# Patient Record
Sex: FEMALE
[De-identification: names, ages, dates, MRNs, and addresses within clinical notes are randomized per-mention and may not be internally consistent; named-entity substitution may affect disease eponyms.]

---

## 2018-02-28 ENCOUNTER — HOSPITAL ENCOUNTER (EMERGENCY)
Dept: HOSPITAL 14 - H.ER | Age: 33
Discharge: HOME | End: 2018-02-28
Payer: COMMERCIAL

## 2018-02-28 VITALS
SYSTOLIC BLOOD PRESSURE: 121 MMHG | OXYGEN SATURATION: 99 % | TEMPERATURE: 98.5 F | RESPIRATION RATE: 18 BRPM | HEART RATE: 108 BPM | DIASTOLIC BLOOD PRESSURE: 76 MMHG

## 2018-02-28 DIAGNOSIS — Z36.9: ICD-10-CM

## 2018-02-28 DIAGNOSIS — O20.0: Primary | ICD-10-CM

## 2018-02-28 DIAGNOSIS — Z3A.18: ICD-10-CM

## 2018-02-28 LAB
ALBUMIN SERPL-MCNC: 3.5 G/DL (ref 3.5–5)
ALBUMIN/GLOB SERPL: 1 {RATIO} (ref 1–2.1)
ALT SERPL-CCNC: 83 U/L (ref 9–52)
AST SERPL-CCNC: 71 U/L (ref 14–36)
BASOPHILS # BLD AUTO: 0 K/UL (ref 0–0.2)
BASOPHILS NFR BLD: 0.2 % (ref 0–2)
BUN SERPL-MCNC: 11 MG/DL (ref 7–17)
CALCIUM SERPL-MCNC: 9.5 MG/DL (ref 8.4–10.2)
EOSINOPHIL # BLD AUTO: 0.3 K/UL (ref 0–0.7)
EOSINOPHIL NFR BLD: 2.4 % (ref 0–4)
ERYTHROCYTE [DISTWIDTH] IN BLOOD BY AUTOMATED COUNT: 12.9 % (ref 11.5–14.5)
GFR NON-AFRICAN AMERICAN: > 60
HGB BLD-MCNC: 11.5 G/DL (ref 12–16)
LYMPHOCYTES # BLD AUTO: 2 K/UL (ref 1–4.3)
LYMPHOCYTES NFR BLD AUTO: 13.8 % (ref 20–40)
MCH RBC QN AUTO: 32.3 PG (ref 27–31)
MCHC RBC AUTO-ENTMCNC: 34.4 G/DL (ref 33–37)
MCV RBC AUTO: 93.8 FL (ref 81–99)
MONOCYTES # BLD: 1 K/UL (ref 0–0.8)
MONOCYTES NFR BLD: 6.9 % (ref 0–10)
NEUTROPHILS # BLD: 11 K/UL (ref 1.8–7)
NEUTROPHILS NFR BLD AUTO: 76.7 % (ref 50–75)
NRBC BLD AUTO-RTO: 0 % (ref 0–0)
PLATELET # BLD: 242 K/UL (ref 130–400)
PMV BLD AUTO: 8.4 FL (ref 7.2–11.7)
RBC # BLD AUTO: 3.55 MIL/UL (ref 3.8–5.2)
WBC # BLD AUTO: 14.3 K/UL (ref 4.8–10.8)

## 2018-02-28 PROCEDURE — 84702 CHORIONIC GONADOTROPIN TEST: CPT

## 2018-02-28 PROCEDURE — 99282 EMERGENCY DEPT VISIT SF MDM: CPT

## 2018-02-28 PROCEDURE — 81025 URINE PREGNANCY TEST: CPT

## 2018-02-28 PROCEDURE — 86900 BLOOD TYPING SEROLOGIC ABO: CPT

## 2018-02-28 PROCEDURE — 80053 COMPREHEN METABOLIC PANEL: CPT

## 2018-02-28 PROCEDURE — 76815 OB US LIMITED FETUS(S): CPT

## 2018-02-28 PROCEDURE — 86850 RBC ANTIBODY SCREEN: CPT

## 2018-02-28 PROCEDURE — 85025 COMPLETE CBC W/AUTO DIFF WBC: CPT

## 2018-02-28 NOTE — ED PDOC
HPI: Female  Pain


Time Seen by Provider: 18 16:37


Chief Complaint (Nursing): Abdominal Pain


Chief Complaint (Provider): Pelvic pain


History Per: Patient


History/Exam Limitations: no limitations


Onset/Duration Of Symptoms: Days (2)


Additional Complaint(s): 





Pelvic pain and cramping for 2 days.  Off and on.  No vaginal bleeding.  Is 

18wks preg.  No weakness, dysuria, abd pain, headaches.  No back pain, chest 

pain.  





Past Medical History


Reviewed: Nursing Documentation, Vital Signs


Vital Signs: 





 Last Vital Signs











Temp  98.5 F   18 15:50


 


Pulse  108 H  18 15:50


 


Resp  18   18 15:50


 


BP  121/76   18 15:50


 


Pulse Ox  99   18 15:50














- Medical History


PMH: No Chronic Diseases





- Surgical History


Surgical History: No Surg Hx





- Family History


Family History: States: Unknown Family Hx





- Immunization History


Hx Tetanus Toxoid Vaccination: No


Hx Influenza Vaccination: No


Hx Pneumococcal Vaccination: No





- Home Medications


Home Medications: 


 Ambulatory Orders











 Medication  Instructions  Recorded


 


Dextromethorphan Polistirex 30 mg PO BID #100 mer.er.12h 17





[Delsym]  


 


Oseltamivir Phosphate [Tamiflu] 75 mg PO BID #10 capsule 17


 


Naproxen [Naprosyn] 500 mg PO BID PRN #15 tablet 17


 


Naproxen [Naprosyn Tab] 1 tab PO Q8 PRN #21 tab 17














- Allergies


Allergies/Adverse Reactions: 


 Allergies











Allergy/AdvReac Type Severity Reaction Status Date / Time


 


No Known Allergies Allergy   Verified 17 14:33














Review of Systems


ROS Statement: Except As Marked, All Systems Reviewed And Found Negative


Genitourinary Female: Positive for: Pelvic Pain





Physical Exam





- Reviewed


Nursing Documentation Reviewed: Yes


Vital Signs Reviewed: Yes





- Physical Exam


Appears: Positive for: Non-toxic, No Acute Distress


Head Exam: Positive for: ATRAUMATIC, NORMAL INSPECTION, NORMOCEPHALIC


Skin: Positive for: Normal Color, Warm, DRY


Eye Exam: Positive for: EOMI, Normal appearance, PERRL


ENT: Positive for: Normal ENT Inspection


Neck: Positive for: Normal, Painless ROM


Cardiovascular/Chest: Positive for: Regular Rate, Rhythm


Respiratory: Positive for: CNT, Normal Breath Sounds


Gastrointestinal/Abdominal: Positive for: Bowel Sounds, Soft, Tenderness (

across lower pelvic)


Back: Positive for: Normal Inspection.  Negative for: L CVA Tenderness, R CVA 

Tenderness


Extremity: Positive for: Normal ROM.  Negative for: Tenderness, Pedal Edema


Neurologic/Psych: Positive for: Alert, Oriented





- Laboratory Results


Result Diagrams: 


 18 18:19





 18 18:19


Interpretation Of Abn Labs: pos rh





- ECG


O2 Sat by Pulse Oximetry: 99


Pulse Ox Interpretation: Normal





- CT Scan/US


  ** US


Other Rad Studies (CT/US): Radiology Report Reviewed


Other Rad Interpretation: iup and subchorionic bleed





- Progress


ED Course And Treament: 





1943:  Stable.  AAOx3.  Pain free.  Tolerated PO.  Fu with ob.





Disposition





- Clinical Impression


Clinical Impression: 


 Threatened 








- Patient ED Disposition


Is Patient to be Admitted: No


Counseled Patient/Family Regarding: Studies Performed, Diagnosis, Need For 

Followup





- Disposition


Referrals: 


Ollie Sweet MD [Staff Provider] - 18


Disposition: Routine/Home


Disposition Time: 19:00


Condition: STABLE


Additional Instructions: 


Return if not better in 3 days.


Instructions:  Threatened Miscarriage


Forms:  [facility] (English), [facility] ED School/Work Excuse

## 2018-03-01 NOTE — US
PROCEDURE:  Obstetrical ultrasound examination



HISTORY:

pain of pregnancy



COMPARISON:

Not available



TECHNIQUE:

Transabdominal



FINDINGS:

Ultrasound examination demonstrates a single live intrauterine 

gestation in cephalic presentation. The heart rate is 149 beats per 

minute. A posterior placenta is identified.  There is no evidence of 

placenta previa. The cervix is long and closed.  It measures 4.9 cm 

in length. A slightly irregular intramural fluid collection is seen 

near the internal os, 0.9 x 1.5 cm. Likely nabothian cyst. A grossly 

normal quantity of amniotic fluid is visualized. 



Fetal biometry yields a gestational age of 17 weeks 6 days.  The SAMANTHA 

by ultrasound is 8/2/2018. Please note that the femur length/ head 

circumference ratio is above normal range. However, the head 

circumference/abdominal circumference ratio is within the normal 

range. Nevertheless, followup ultrasound examination during 2nd 

trimester is advised. . 



Fetal anatomic evaluation is grossly limited. 



IMPRESSION:

Single live intrauterine gestation of approximately 17 weeks 6 days. 

Fetal heart rate 149 beats per minute. Posterior placenta. No previa. 

 Cervix closed. Please note that the femur length/head circumference 

ratio is above normal range.  Followup ultrasound during the 2nd 

trimester of pregnancy is advised. 



Preliminary interpretation of this examination was reported by 

[facility] at 7:52 p.m. on 2/28/2018.. There is concurrence 

of this report with the preliminary interpretation.

## 2018-08-03 ENCOUNTER — HOSPITAL ENCOUNTER (INPATIENT)
Dept: HOSPITAL 14 - H.EROB2 | Age: 33
LOS: 3 days | Discharge: HOME | DRG: 373 | End: 2018-08-06
Attending: SPECIALIST | Admitting: SPECIALIST
Payer: COMMERCIAL

## 2018-08-03 VITALS — BODY MASS INDEX: 29.6 KG/M2

## 2018-08-03 DIAGNOSIS — Z3A.40: ICD-10-CM

## 2018-08-03 DIAGNOSIS — O48.0: Primary | ICD-10-CM

## 2018-08-03 LAB
BASOPHILS # BLD AUTO: 0 K/UL (ref 0–0.2)
BASOPHILS NFR BLD: 0.2 % (ref 0–2)
EOSINOPHIL # BLD AUTO: 0.2 K/UL (ref 0–0.7)
EOSINOPHIL NFR BLD: 1.6 % (ref 0–4)
ERYTHROCYTE [DISTWIDTH] IN BLOOD BY AUTOMATED COUNT: 14.2 % (ref 11.5–14.5)
HGB BLD-MCNC: 11.9 G/DL (ref 12–16)
LYMPHOCYTES # BLD AUTO: 2 K/UL (ref 1–4.3)
LYMPHOCYTES NFR BLD AUTO: 13.9 % (ref 20–40)
MCH RBC QN AUTO: 30.7 PG (ref 27–31)
MCHC RBC AUTO-ENTMCNC: 33.6 G/DL (ref 33–37)
MCV RBC AUTO: 91.3 FL (ref 81–99)
MONOCYTES # BLD: 1 K/UL (ref 0–0.8)
MONOCYTES NFR BLD: 6.9 % (ref 0–10)
NEUTROPHILS # BLD: 11.4 K/UL (ref 1.8–7)
NEUTROPHILS NFR BLD AUTO: 77.4 % (ref 50–75)
NRBC BLD AUTO-RTO: 0 % (ref 0–0)
PLATELET # BLD: 267 K/UL (ref 130–400)
PMV BLD AUTO: 8.9 FL (ref 7.2–11.7)
RBC # BLD AUTO: 3.87 MIL/UL (ref 3.8–5.2)
WBC # BLD AUTO: 14.7 K/UL (ref 4.8–10.8)

## 2018-08-03 PROCEDURE — 4A1HXCZ MONITORING OF PRODUCTS OF CONCEPTION, CARDIAC RATE, EXTERNAL APPROACH: ICD-10-PCS | Performed by: SPECIALIST

## 2018-08-03 NOTE — OBADHP
===========================

Datetime: 2018 10:30

===========================

   

Admit Comment, IP Provider:   IUP at 40w c/o ctx last night. She was examnined in the office 
yesterday and norted to be 1cm. She had VB after exam.

      

   POBGYNH: TOP; spont Ab;  x 2

   PMH: denies

   PSH: D_C

   NKA

   PSOH: denies smoking ETOH drugs

      

   A; IUP at 40w

   postdate preg

   PLAN: discussed with PMD; admit to L_D for IOL; Cervidil ordered

   Check HBSAg 

Pelvic Type - PN:  Adequate

Extremities - PN:  Normal

Abdomen - PN:  Normal

Back - PN:  Normal

Breast - PN:  Not Done

Lungs - PN:  Normal

Heart - PN:  Normal

Thyroid - PN:  Normal

Neurologic - PN:  Normal

HEENT - PN:  Normal

General - PN:  Normal

Fetal Presentation-Admit:  Vertex

FHR - Baseline A Provider:  130

Membranes, Provider:  Intact

Pool Provider:  Negative

IP Prenatal Hx Assessment:  The Prenatal History has been Reviewed and is Current

IP Chief Complaint:  Uterine contractions

NICHD Variability Prov Fetus A:  Moderate 6-25bpm

NICHD Accel Fetus A IP Provider:  15X15

FHR Category Provider Fetus A:  Category I

NICHD Decel Fetus A IP Provider:  None

Dilatation, Provider:  0

Effacement, Provider:  0

Genitourinary Exam:  Normal

DTRs - PN:  Normal

IP Adm Impression:  Postterm, intrauterine pregnancy; No Active Labor; Intact Membranes

IP Admit Plan:  Admit to unit; Initiate labor induction protocol

## 2018-08-03 NOTE — OBHP
===========================

Datetime: 2018 10:30

===========================

   

IP Adm Impression:  Postterm, intrauterine pregnancy; No Active Labor; Intact Membranes

IP Admit Plan:  Admit to unit; Initiate labor induction protocol

Admit Comment, IP Provider:   IUP at 40w c/o ctx last night. She was examnined in the office 
yesterday and norted to be 1cm. She had VB after exam.

      

   POBGYNH: TOP; spont Ab;  x 2

   PMH: denies

   PSH: D_C

   NKA

   PSOH: denies smoking ETOH drugs

      

   A; IUP at 40w

   postdate preg

   PLAN: discussed with PMD; admit to L_D for IOL; Cervidil ordered

   Check HBSAg 

Pelvic Type - PN:  Adequate

Extremities - PN:  Normal

Abdomen - PN:  Normal

Back - PN:  Normal

Breast - PN:  Not Done

Lungs - PN:  Normal

Heart - PN:  Normal

Thyroid - PN:  Normal

Neurologic - PN:  Normal

HEENT - PN:  Normal

General - PN:  Normal

Fetal Presentation-Admit:  Vertex

FHR - Baseline A Provider:  130

Membranes, Provider:  Intact

Pool Provider:  Negative

IP Prenatal Hx Assessment:  The Prenatal History has been Reviewed and is Current

IP Chief Complaint:  Uterine contractions

NICHD Variability Prov Fetus A:  Moderate 6-25bpm

NICHD Accel Fetus A IP Provider:  15X15

FHR Category Provider Fetus A:  Category I

NICHD Decel Fetus A IP Provider:  None

Dilatation, Provider:  0

Effacement, Provider:  0

Genitourinary Exam:  Normal

DTRs - PN:  Normal

## 2018-08-04 NOTE — OBDS
===================================

MATERNAL INFORMATION

===================================

   

Maternal Complications:  None

   

===================================

LABOR SUMMARY

===================================

   

EDC:  07/31/2018 00:00

No. Babies in Womb:  1

   

===================================

LABOR INFORMATION

===================================

   

Reason for Induction:  Postterm

Other Ripening Agents:  cervidil

Group B Beta Strep:  Negative

Steroids Given:  None

Reason Steroids Not Administered:  Not Applicable

   

===================================

MEMBRANES

===================================

   

Rupture of Membranes:  08/04/2018 12:15

Amniotic Fluid Color:  Light Meconium

Amniotic Fluid Amount:  Small

Amniotic Fluid Odor:  Normal

   

===================================

VAGINAL DELIVERY

===================================

   

Episiotomy:  None

Laceration Extension:  N/A

Laceration Repair Note:  none

Count Comment:  correct

   

===================================

PRESENTATION/POSITION BABY A

===================================

   

Presentation:  Cephalic

## 2018-08-05 LAB
ERYTHROCYTE [DISTWIDTH] IN BLOOD BY AUTOMATED COUNT: 14.4 % (ref 11.5–14.5)
HGB BLD-MCNC: 10.4 G/DL (ref 12–16)
MCH RBC QN AUTO: 31.1 PG (ref 27–31)
MCHC RBC AUTO-ENTMCNC: 34 G/DL (ref 33–37)
MCV RBC AUTO: 91.6 FL (ref 81–99)
PLATELET # BLD: 243 K/UL (ref 130–400)
RBC # BLD AUTO: 3.34 MIL/UL (ref 3.8–5.2)
WBC # BLD AUTO: 19.4 K/UL (ref 4.8–10.8)

## 2018-08-05 NOTE — OBPPN
===========================

Datetime: 08/05/2018 12:06

===========================

   

PP Pain Prov:  Within normal limits

PP Nausea Prov:  Denies

PP BM Prov:  Yes

PP Breasts Prov:  Normal

PP Lungs Prov:  Normal

PP Abdomen/Uterus Prov:  Abnormal

PP Lochia Prov:  Normal

PP CVA Tenderness Prov:  Normal

PP Extremities Prov:  Normal

PP C/S Incision Prov:  Not Applicable

PP Breastfeeding Progress Prov:  Normal

PP Comments Phys Exam Prov:  breast ne, NT; Abd soft ND fundus firm below the umb NT; ext no calf ten
derness

PP Impression Prov:  Normal postpartum progression

PP Plan Prov:  Continue present management

PP Progress Note Prov:  Continue PP care OOB and ambulation

IP PP Procedures:  None

Vital Signs Provider PP:  Reviewed

## 2018-08-06 NOTE — OBDCSUM
===========================

Datetime: 08/06/2018 08:37

===========================

   

Discharged to, Provider:  Home

Follow up at, Provider:  

Disch Instr Activity:  Normal activity; Bedrest; May be up to bathroom; May be up for meals; May Show
er

Disch Instr Diet:  Regular

Discharge Instructions, Provider:  Routine instructions given

Discharge Diagnosis, Provider:  Term Pregnancy Delivered

Follow up in weeks, Provider:  1week in office

Disch Referrals:  None

Disch Activity Restrictions:  No exercising; No lifting; No driving; Minimize walking; Minimize stair
-climbing; No sexual activity; Nothing in vagina - [place], tampons, douche

Contraception after Delivery:  Undecided

## 2018-08-08 VITALS
OXYGEN SATURATION: 100 % | SYSTOLIC BLOOD PRESSURE: 115 MMHG | HEART RATE: 83 BPM | RESPIRATION RATE: 19 BRPM | TEMPERATURE: 98.4 F | DIASTOLIC BLOOD PRESSURE: 70 MMHG